# Patient Record
Sex: FEMALE | Race: WHITE | NOT HISPANIC OR LATINO | URBAN - METROPOLITAN AREA
[De-identification: names, ages, dates, MRNs, and addresses within clinical notes are randomized per-mention and may not be internally consistent; named-entity substitution may affect disease eponyms.]

---

## 2019-08-28 ENCOUNTER — INPATIENT (INPATIENT)
Facility: HOSPITAL | Age: 69
LOS: 1 days | Discharge: HOME | DRG: 392 | End: 2019-08-30
Attending: SURGERY | Admitting: SURGERY
Payer: MEDICARE

## 2019-08-28 VITALS
TEMPERATURE: 99 F | SYSTOLIC BLOOD PRESSURE: 105 MMHG | HEART RATE: 112 BPM | HEIGHT: 61 IN | WEIGHT: 121.92 LBS | OXYGEN SATURATION: 98 % | DIASTOLIC BLOOD PRESSURE: 70 MMHG | RESPIRATION RATE: 16 BRPM

## 2019-08-28 DIAGNOSIS — Z98.891 HISTORY OF UTERINE SCAR FROM PREVIOUS SURGERY: Chronic | ICD-10-CM

## 2019-08-28 PROCEDURE — 99285 EMERGENCY DEPT VISIT HI MDM: CPT

## 2019-08-28 PROCEDURE — 74177 CT ABD & PELVIS W/CONTRAST: CPT | Mod: 26

## 2019-08-28 RX ORDER — ENOXAPARIN SODIUM 100 MG/ML
40 INJECTION SUBCUTANEOUS DAILY
Refills: 0 | Status: DISCONTINUED | OUTPATIENT
Start: 2019-08-28 | End: 2019-08-30

## 2019-08-28 RX ORDER — FLUTICASONE PROPIONATE 220 MCG
0 AEROSOL WITH ADAPTER (GRAM) INHALATION
Qty: 0 | Refills: 0 | DISCHARGE

## 2019-08-28 RX ORDER — CEFTRIAXONE 500 MG/1
1000 INJECTION, POWDER, FOR SOLUTION INTRAMUSCULAR; INTRAVENOUS ONCE
Refills: 0 | Status: COMPLETED | OUTPATIENT
Start: 2019-08-28 | End: 2019-08-28

## 2019-08-28 RX ORDER — ASPIRIN/CALCIUM CARB/MAGNESIUM 324 MG
1 TABLET ORAL
Qty: 0 | Refills: 0 | DISCHARGE

## 2019-08-28 RX ORDER — IOHEXOL 300 MG/ML
30 INJECTION, SOLUTION INTRAVENOUS ONCE
Refills: 0 | Status: COMPLETED | OUTPATIENT
Start: 2019-08-28 | End: 2019-08-28

## 2019-08-28 RX ORDER — SODIUM CHLORIDE 9 MG/ML
1000 INJECTION, SOLUTION INTRAVENOUS
Refills: 0 | Status: DISCONTINUED | OUTPATIENT
Start: 2019-08-28 | End: 2019-08-29

## 2019-08-28 RX ORDER — METRONIDAZOLE 500 MG
500 TABLET ORAL ONCE
Refills: 0 | Status: COMPLETED | OUTPATIENT
Start: 2019-08-28 | End: 2019-08-28

## 2019-08-28 RX ORDER — MONTELUKAST 4 MG/1
0 TABLET, CHEWABLE ORAL
Qty: 0 | Refills: 0 | DISCHARGE

## 2019-08-28 RX ORDER — ONDANSETRON 8 MG/1
4 TABLET, FILM COATED ORAL ONCE
Refills: 0 | Status: COMPLETED | OUTPATIENT
Start: 2019-08-28 | End: 2019-08-28

## 2019-08-28 RX ORDER — DIPHENHYDRAMINE HCL 50 MG
25 CAPSULE ORAL ONCE
Refills: 0 | Status: COMPLETED | OUTPATIENT
Start: 2019-08-28 | End: 2019-08-28

## 2019-08-28 RX ORDER — ESCITALOPRAM OXALATE 10 MG/1
0 TABLET, FILM COATED ORAL
Qty: 0 | Refills: 0 | DISCHARGE

## 2019-08-28 RX ORDER — SODIUM CHLORIDE 9 MG/ML
1000 INJECTION INTRAMUSCULAR; INTRAVENOUS; SUBCUTANEOUS ONCE
Refills: 0 | Status: COMPLETED | OUTPATIENT
Start: 2019-08-28 | End: 2019-08-28

## 2019-08-28 RX ORDER — ASPIRIN/CALCIUM CARB/MAGNESIUM 324 MG
81 TABLET ORAL DAILY
Refills: 0 | Status: DISCONTINUED | OUTPATIENT
Start: 2019-08-28 | End: 2019-08-30

## 2019-08-28 RX ORDER — AZELASTINE 137 UG/1
0 SPRAY, METERED NASAL
Qty: 0 | Refills: 0 | DISCHARGE

## 2019-08-28 RX ORDER — ATORVASTATIN CALCIUM 80 MG/1
0 TABLET, FILM COATED ORAL
Qty: 0 | Refills: 0 | DISCHARGE

## 2019-08-28 RX ORDER — ATORVASTATIN CALCIUM 80 MG/1
20 TABLET, FILM COATED ORAL AT BEDTIME
Refills: 0 | Status: DISCONTINUED | OUTPATIENT
Start: 2019-08-28 | End: 2019-08-30

## 2019-08-28 RX ORDER — ESCITALOPRAM OXALATE 10 MG/1
10 TABLET, FILM COATED ORAL DAILY
Refills: 0 | Status: DISCONTINUED | OUTPATIENT
Start: 2019-08-28 | End: 2019-08-30

## 2019-08-28 RX ADMIN — Medication 25 MILLIGRAM(S): at 18:42

## 2019-08-28 RX ADMIN — SODIUM CHLORIDE 1000 MILLILITER(S): 9 INJECTION INTRAMUSCULAR; INTRAVENOUS; SUBCUTANEOUS at 14:11

## 2019-08-28 RX ADMIN — ONDANSETRON 4 MILLIGRAM(S): 8 TABLET, FILM COATED ORAL at 14:11

## 2019-08-28 RX ADMIN — SODIUM CHLORIDE 1000 MILLILITER(S): 9 INJECTION INTRAMUSCULAR; INTRAVENOUS; SUBCUTANEOUS at 18:44

## 2019-08-28 RX ADMIN — SODIUM CHLORIDE 1000 MILLILITER(S): 9 INJECTION INTRAMUSCULAR; INTRAVENOUS; SUBCUTANEOUS at 17:58

## 2019-08-28 RX ADMIN — Medication 100 MILLIGRAM(S): at 18:43

## 2019-08-28 RX ADMIN — CEFTRIAXONE 100 MILLIGRAM(S): 500 INJECTION, POWDER, FOR SOLUTION INTRAMUSCULAR; INTRAVENOUS at 17:58

## 2019-08-28 RX ADMIN — IOHEXOL 30 MILLILITER(S): 300 INJECTION, SOLUTION INTRAVENOUS at 14:11

## 2019-08-28 RX ADMIN — CEFTRIAXONE 1000 MILLIGRAM(S): 500 INJECTION, POWDER, FOR SOLUTION INTRAMUSCULAR; INTRAVENOUS at 18:44

## 2019-08-28 RX ADMIN — IOHEXOL 30 MILLILITER(S): 300 INJECTION, SOLUTION INTRAVENOUS at 22:24

## 2019-08-28 NOTE — ED ADULT TRIAGE NOTE - CHIEF COMPLAINT QUOTE
pt c/o lower abdominal since Sunday. dx with UTI at urgent care yesterday. denies fever/chills, hematuria.

## 2019-08-28 NOTE — ED PROVIDER NOTE - OBJECTIVE STATEMENT
69 female presents to ED with concern for lower abdominal pain over the past 3 days.  She was seen at urgent care yesterday and found to have a urinary tract infection - started on therapy with macrobid which she has taken x 1 day.  She notes pain is not improving, prompting her ED visit today.  She denies associated fever, chills, chest pain, shortness of breath, abdominal pain, nausea, emesis, changes to bowel movements, peripheral edema, rashes, recent travel, sick contacts or any additional acute complaints or concerns at this time.

## 2019-08-28 NOTE — ED ADULT NURSE REASSESSMENT NOTE - NS ED NURSE REASSESS COMMENT FT1
as per surgery md mari pierson and md ramírez, pt is to receive a ct 4 hours after she drinks contrast. pt is aware.

## 2019-08-28 NOTE — H&P ADULT - NSHPLABSRESULTS_GEN_ALL_CORE
Vital Signs Last 24 Hrs  T(C): 36.7 (28 Aug 2019 22:15), Max: 37.3 (28 Aug 2019 13:14)  T(F): 98 (28 Aug 2019 22:15), Max: 99.2 (28 Aug 2019 18:19)  HR: 75 (28 Aug 2019 22:15) (75 - 112)  BP: 133/82 (28 Aug 2019 22:15) (105/70 - 133/82)  BP(mean): --  RR: 16 (28 Aug 2019 22:15) (16 - 18)  SpO2: 95% (28 Aug 2019 22:15) (94% - 98%)      LABS:                        11.4   13.39 )-----------( 320      ( 28 Aug 2019 13:44 )             35.9     08-28    138  |  102  |  17  ----------------------------<  111<H>  3.8   |  23  |  0.88    Ca    9.0      28 Aug 2019 13:44    TPro  7.1  /  Alb  3.7  /  TBili  0.9  /  DBili  <0.2  /  AST  25  /  ALT  18  /  AlkPhos  118  08-28    PT/INR - ( 28 Aug 2019 14:15 )   PT: 12.5 sec;   INR: 1.10          PTT - ( 28 Aug 2019 14:15 )  PTT:27.8 sec    < from: CT Abdomen and Pelvis w/ Oral Cont and w/ IV Cont (08.28.19 @ 16:08) >    FINDINGS:    LOWER CHEST: Basilar atelectasis in the right lower lobe. Multiple 3 to 5   mm groundglass opacities in the visualized bilateral lower lungs and   bronchovascular distribution. Linear atelectases in both lower lobes. No   pleural effusion.    LIVER: Punctate hypodensity in the segment 2 (3:13), likely hepatic cyst.   6 mm focal hypodensity in the segment 8 (3:20), too small to   characterize, and may reflect hepatic hemangioma. The main portal vein is   patent.  GALLBLADDER AND BILIARY DUCTS: Normal caliber.  SPLEEN: Within normal limits.  PANCREAS: Within normal limits.  ADRENALS: Within normal limits.  KIDNEYS/URETERS: Mildly dilated palpable calyceal system bilaterally.   Nonobstructing 4 mm renal stone in the left interpolar kidney. No focal   parenchymal abnormality.    BLADDER: Mild diffuse wall thickening of urinary bladder.  REPRODUCTIVE ORGANS: The uterus and adnexa are unremarkable.    BOWEL: Markedly edematous sigmoid colon demonstrating wall thickening and   mucosal hyperenhancement. Focal area of possible caliber change and   luminal narrowing in the mid sigmoid colon (3:83) is noted. Associated   mild pericolic fluid/fat stranding along the sigmoid colon.   Large hiatal hernia containing proximal half of the stomach. The   dependent transverse colon, with cecum and ileocecal valve identified in   the right upper quadrant. Moderate fecal material within the colon. No   evidence of obstruction.  PERITONEUM/RETROPERITONEUM: Minimal pelvic ascites.  VESSELS:  Atherosclerotic calcification in the abdominal aorta.  LYMPH NODES: Lymph nodes are not enlarged.    BONES AND SOFT TISSUE: Degenerative change in the spine.      IMPRESSION:   1.  Severe diffuse bowel wall thickening in the sigmoid colon, likely   reflecting colitis. Possible are of focal stenosis in the mid sigmoid   coloncausing possible partial obstruction. Moderate fecal impaction in   the colon in the descending colon with mild dilatation. Follow-up with   colonoscopy is recommended once the active inflammation is resolved, to   evaluate for occult malignancy.  2. Mild diffuse thickening of urinary bladder wall with mild fat   stranding, may reflect cystitis.  3.  Large hiatal hernia containing proximal half of the stomach.  4.  4 mm nonobstructing left renal stone.   5.  Multiple 3-5 mm nodular ground glass opacities in both lower lungs,   may be infectious/inflammatory. Follow-up with chest CT at 3 months is   recommended to ensure resolution.           < end of copied text >

## 2019-08-28 NOTE — ED ADULT NURSE NOTE - CHPI ED NUR SYMPTOMS NEG
no blood in stool/no burning urination/no diarrhea/no abdominal distension/no vomiting/no fever/no hematuria/no chills/no dysuria

## 2019-08-28 NOTE — ED ADULT NURSE NOTE - CAS DISCH BELONGINGS RETURNED
jak saini is aware that pts repeat ct is at 230 am. surgery md mari pierson was notified to place the ct order. ct tech was also notified of pts pending ct. jak saini is aware that pts repeat ct is at 230 am. surgery md mari pierson was notified to place the ct order. ct tech was also notified of pts pending ct./Yes

## 2019-08-28 NOTE — ED ADULT NURSE REASSESSMENT NOTE - NS ED NURSE REASSESS COMMENT FT1
Patient a/o X 3, no abdominal pain or nausea complaint, vital signs stable, NSS 1 L bolus completed, NPO observed.  CT scan done.  Results and disposition pending.

## 2019-08-28 NOTE — ED ADULT NURSE REASSESSMENT NOTE - NS ED NURSE REASSESS COMMENT FT1
Patient a/oX 3, anxious, c/o of lower abdominal pain since Sunday, w/ nausea,  no vomitting, no diarrhea/dysuria, states had previously urinary urgency and frequency, resolved after Dx w/ UTI and taking Bactrim PO. Vital signs stable.  Left AC PIV #20 in place, all labs sent, no complications.  NSS 1 L bolus ongoing, Zofran 4mg IVP administered,  Oral contrast ongoing, tolerating well.  CT scan pending.

## 2019-08-28 NOTE — H&P ADULT - ASSESSMENT
69 F PMH urinary retention unknown origin(self straight cath 6-7xdaily), TIA 2 yrs ago on Aspirin, PSH , presenting with 3 days of LLQ pain CT with evidence of sigmoid colitis and possible stricture of descending colon.    Admit to General Surgery, Dr. Vanegas, [facility]  NPO/IVF  Repeat CT Abd/pelvis w/po and IV contrast for further evaluation of partial LBO  GI consult for sigmoid colitis-Dr. Ortega  AM labs  Encourage OOB/Ambulation  Lovenox/SCDs for DVT prophylaxis  Discussed w/chief resident and Dr. Dawn 69 F PMH urinary retention unknown origin(self straight cath 6-7xdaily), TIA 2 yrs ago on Aspirin, PSH , presenting with 3 days of LLQ pain CT with evidence of sigmoid colitis and possible stricture of descending colon.    Admit to General Surgery, Dr. Vanegas, [facility]  NPO/IVF  Repeat CT Abd/pelvis w/po and IV contrast for further evaluation of partial LBO  Macrobid for UTI  GI consult for sigmoid colitis-Dr. Ortega  AM labs  Encourage OOB/Ambulation  Lovenox/SCDs for DVT prophylaxis  Discussed w/chief resident and Dr. Dawn

## 2019-08-28 NOTE — ED ADULT NURSE NOTE - OBJECTIVE STATEMENT
Patient c/o of abdominal pain w/ nausea, no vomitting, states Dx w/ UTI at  last Sunday, taking BActrim PO, still w/ abdominal pain.

## 2019-08-28 NOTE — H&P ADULT - NSHPPHYSICALEXAM_GEN_ALL_CORE
General: NAD, resting comfortably  Neuro: AAOX3, EOMI, PERRLA, no scleral icterus  Pulm: CTAB, Nonlabored breathing  CV: S1/S2 normal, no murmurs  Abdomen: soft, nondistended, moderate tenderness in LLQ, no rebound, no guarding  Extremities: WWP, No LE edema b/l

## 2019-08-28 NOTE — H&P ADULT - HISTORY OF PRESENT ILLNESS
69 F PMH TIA 2yrs ago, urinary retention unknown origin(self straight cath 6-7xdaily), TIA 2 yrs ago on Aspirin, PSH , presenting with 3 days of LLQ pain associated with one episode of nausea and spit up, Pt states sx started  at rest and worsened to moderated achey pain, pt went to urgent care and found to have UTI, placed on Macrobid. Today pt states pain continued and she presented to ED for further management. Denies N/vomiting, last episode of nausea and spit up on , denies F/chills, denies chest pain/dypnea. Passing flatus, reports constipation this past week but had one well formed bowel movement yesterday and another well formed normal BM this am. Denies ever having EGD, last colonoscopy 7-8 yrs ago and normal per pt. Denies personal or family hx of IBD, denies family hx of cancer. 69 F PMH urinary retention unknown origin(self straight cath 6-7xdaily), TIA 2 yrs ago on Aspirin, PSH , presenting with 3 days of LLQ pain associated with one episode of nausea and spit up, Pt states sx started  at rest and worsened to moderated achey pain, pt went to urgent care and found to have UTI, placed on Macrobid. Today pt states pain continued and she presented to ED for further management. Denies N/vomiting, last episode of nausea and spit up on , denies F/chills, denies chest pain/dypnea. Passing flatus, reports constipation this past week but had one well formed bowel movement yesterday and another well formed normal BM this am. Denies ever having EGD, last colonoscopy 7-8 yrs ago and normal per pt. Denies personal or family hx of IBD, denies family hx of cancer.

## 2019-08-28 NOTE — ED PROVIDER NOTE - CLINICAL SUMMARY MEDICAL DECISION MAKING FREE TEXT BOX
Patient in ED w concern for lower abdominal discomfort.  Non toxic in appearance, + TTP throughout entire abdomen, L>R.  Labs and CT abd/pel reviewed.  Slightly elevated WBC ct noted.  CT concerning for colitis with possible obstruction and general surgery team is consulted and in ED to evaluate patient.  IV abx ordered and patient is given additional IVF bolus.  Gen sx plan for admission at this time.  Gen sx has discussed w patient as she is upset for her wait in disposition.  Patient is ultimately agreeable to plan for admission.  Will admit at this time.

## 2019-08-29 DIAGNOSIS — R93.3 ABNORMAL FINDINGS ON DIAGNOSTIC IMAGING OF OTHER PARTS OF DIGESTIVE TRACT: ICD-10-CM

## 2019-08-29 LAB
ANION GAP SERPL CALC-SCNC: 10 MMOL/L — SIGNIFICANT CHANGE UP (ref 5–17)
BUN SERPL-MCNC: 11 MG/DL — SIGNIFICANT CHANGE UP (ref 7–23)
CALCIUM SERPL-MCNC: 8.2 MG/DL — LOW (ref 8.4–10.5)
CHLORIDE SERPL-SCNC: 109 MMOL/L — HIGH (ref 96–108)
CO2 SERPL-SCNC: 22 MMOL/L — SIGNIFICANT CHANGE UP (ref 22–31)
CREAT SERPL-MCNC: 0.82 MG/DL — SIGNIFICANT CHANGE UP (ref 0.5–1.3)
GLUCOSE SERPL-MCNC: 93 MG/DL — SIGNIFICANT CHANGE UP (ref 70–99)
HCT VFR BLD CALC: 36.7 % — SIGNIFICANT CHANGE UP (ref 34.5–45)
HCV AB S/CO SERPL IA: 0.09 S/CO — SIGNIFICANT CHANGE UP
HCV AB SERPL-IMP: SIGNIFICANT CHANGE UP
HGB BLD-MCNC: 11 G/DL — LOW (ref 11.5–15.5)
MAGNESIUM SERPL-MCNC: 2 MG/DL — SIGNIFICANT CHANGE UP (ref 1.6–2.6)
MCHC RBC-ENTMCNC: 28.2 PG — SIGNIFICANT CHANGE UP (ref 27–34)
MCHC RBC-ENTMCNC: 30 GM/DL — LOW (ref 32–36)
MCV RBC AUTO: 94.1 FL — SIGNIFICANT CHANGE UP (ref 80–100)
NRBC # BLD: 0 /100 WBCS — SIGNIFICANT CHANGE UP (ref 0–0)
PHOSPHATE SERPL-MCNC: 2.2 MG/DL — LOW (ref 2.5–4.5)
PLATELET # BLD AUTO: 291 K/UL — SIGNIFICANT CHANGE UP (ref 150–400)
POTASSIUM SERPL-MCNC: 3.5 MMOL/L — SIGNIFICANT CHANGE UP (ref 3.5–5.3)
POTASSIUM SERPL-SCNC: 3.5 MMOL/L — SIGNIFICANT CHANGE UP (ref 3.5–5.3)
RBC # BLD: 3.9 M/UL — SIGNIFICANT CHANGE UP (ref 3.8–5.2)
RBC # FLD: 14.3 % — SIGNIFICANT CHANGE UP (ref 10.3–14.5)
SODIUM SERPL-SCNC: 141 MMOL/L — SIGNIFICANT CHANGE UP (ref 135–145)
WBC # BLD: 8.62 K/UL — SIGNIFICANT CHANGE UP (ref 3.8–10.5)
WBC # FLD AUTO: 8.62 K/UL — SIGNIFICANT CHANGE UP (ref 3.8–10.5)

## 2019-08-29 PROCEDURE — 74177 CT ABD & PELVIS W/CONTRAST: CPT | Mod: 26

## 2019-08-29 RX ORDER — NITROFURANTOIN MACROCRYSTAL 50 MG
100 CAPSULE ORAL
Refills: 0 | Status: DISCONTINUED | OUTPATIENT
Start: 2019-08-29 | End: 2019-08-29

## 2019-08-29 RX ORDER — SENNA PLUS 8.6 MG/1
2 TABLET ORAL AT BEDTIME
Refills: 0 | Status: DISCONTINUED | OUTPATIENT
Start: 2019-08-29 | End: 2019-08-30

## 2019-08-29 RX ORDER — ONDANSETRON 8 MG/1
4 TABLET, FILM COATED ORAL EVERY 4 HOURS
Refills: 0 | Status: DISCONTINUED | OUTPATIENT
Start: 2019-08-29 | End: 2019-08-30

## 2019-08-29 RX ORDER — DEXTROSE MONOHYDRATE, SODIUM CHLORIDE, AND POTASSIUM CHLORIDE 50; .745; 4.5 G/1000ML; G/1000ML; G/1000ML
1000 INJECTION, SOLUTION INTRAVENOUS
Refills: 0 | Status: DISCONTINUED | OUTPATIENT
Start: 2019-08-29 | End: 2019-08-30

## 2019-08-29 RX ORDER — MULTIVIT WITH MIN/MFOLATE/K2 340-15/3 G
1 POWDER (GRAM) ORAL ONCE
Refills: 0 | Status: COMPLETED | OUTPATIENT
Start: 2019-08-29 | End: 2019-08-29

## 2019-08-29 RX ORDER — ONDANSETRON 8 MG/1
4 TABLET, FILM COATED ORAL ONCE
Refills: 0 | Status: COMPLETED | OUTPATIENT
Start: 2019-08-29 | End: 2019-08-29

## 2019-08-29 RX ORDER — POLYETHYLENE GLYCOL 3350 17 G/17G
17 POWDER, FOR SOLUTION ORAL
Refills: 0 | Status: DISCONTINUED | OUTPATIENT
Start: 2019-08-29 | End: 2019-08-30

## 2019-08-29 RX ORDER — SOD SULF/SODIUM/NAHCO3/KCL/PEG
4000 SOLUTION, RECONSTITUTED, ORAL ORAL ONCE
Refills: 0 | Status: DISCONTINUED | OUTPATIENT
Start: 2019-08-29 | End: 2019-08-30

## 2019-08-29 RX ORDER — POTASSIUM CHLORIDE 20 MEQ
10 PACKET (EA) ORAL
Refills: 0 | Status: COMPLETED | OUTPATIENT
Start: 2019-08-29 | End: 2019-08-29

## 2019-08-29 RX ORDER — NITROFURANTOIN MACROCRYSTAL 50 MG
100 CAPSULE ORAL
Refills: 0 | Status: DISCONTINUED | OUTPATIENT
Start: 2019-08-29 | End: 2019-08-30

## 2019-08-29 RX ORDER — POTASSIUM PHOSPHATE, MONOBASIC POTASSIUM PHOSPHATE, DIBASIC 236; 224 MG/ML; MG/ML
15 INJECTION, SOLUTION INTRAVENOUS ONCE
Refills: 0 | Status: COMPLETED | OUTPATIENT
Start: 2019-08-29 | End: 2019-08-29

## 2019-08-29 RX ORDER — DOCUSATE SODIUM 100 MG
100 CAPSULE ORAL THREE TIMES A DAY
Refills: 0 | Status: DISCONTINUED | OUTPATIENT
Start: 2019-08-29 | End: 2019-08-30

## 2019-08-29 RX ORDER — PANTOPRAZOLE SODIUM 20 MG/1
40 TABLET, DELAYED RELEASE ORAL
Refills: 0 | Status: DISCONTINUED | OUTPATIENT
Start: 2019-08-29 | End: 2019-08-30

## 2019-08-29 RX ORDER — SOD SULF/SODIUM/NAHCO3/KCL/PEG
4000 SOLUTION, RECONSTITUTED, ORAL ORAL ONCE
Refills: 0 | Status: DISCONTINUED | OUTPATIENT
Start: 2019-08-29 | End: 2019-08-29

## 2019-08-29 RX ADMIN — ONDANSETRON 4 MILLIGRAM(S): 8 TABLET, FILM COATED ORAL at 20:56

## 2019-08-29 RX ADMIN — SODIUM CHLORIDE 90 MILLILITER(S): 9 INJECTION, SOLUTION INTRAVENOUS at 03:15

## 2019-08-29 RX ADMIN — Medication 100 MILLIGRAM(S): at 21:22

## 2019-08-29 RX ADMIN — POLYETHYLENE GLYCOL 3350 17 GRAM(S): 17 POWDER, FOR SOLUTION ORAL at 17:55

## 2019-08-29 RX ADMIN — SENNA PLUS 2 TABLET(S): 8.6 TABLET ORAL at 21:22

## 2019-08-29 RX ADMIN — Medication 100 MILLIGRAM(S): at 17:55

## 2019-08-29 RX ADMIN — Medication 100 MILLIGRAM(S): at 15:50

## 2019-08-29 RX ADMIN — ESCITALOPRAM OXALATE 10 MILLIGRAM(S): 10 TABLET, FILM COATED ORAL at 12:20

## 2019-08-29 RX ADMIN — Medication 100 MILLIGRAM(S): at 07:20

## 2019-08-29 RX ADMIN — Medication 100 MILLIEQUIVALENT(S): at 08:23

## 2019-08-29 RX ADMIN — Medication 1 BOTTLE: at 17:55

## 2019-08-29 RX ADMIN — Medication 81 MILLIGRAM(S): at 12:20

## 2019-08-29 RX ADMIN — ONDANSETRON 4 MILLIGRAM(S): 8 TABLET, FILM COATED ORAL at 17:55

## 2019-08-29 RX ADMIN — ATORVASTATIN CALCIUM 20 MILLIGRAM(S): 80 TABLET, FILM COATED ORAL at 21:22

## 2019-08-29 RX ADMIN — DEXTROSE MONOHYDRATE, SODIUM CHLORIDE, AND POTASSIUM CHLORIDE 150 MILLILITER(S): 50; .745; 4.5 INJECTION, SOLUTION INTRAVENOUS at 18:03

## 2019-08-29 RX ADMIN — POTASSIUM PHOSPHATE, MONOBASIC POTASSIUM PHOSPHATE, DIBASIC 63.75 MILLIMOLE(S): 236; 224 INJECTION, SOLUTION INTRAVENOUS at 18:44

## 2019-08-29 RX ADMIN — Medication 100 MILLIEQUIVALENT(S): at 12:19

## 2019-08-29 RX ADMIN — Medication 100 MILLIEQUIVALENT(S): at 15:49

## 2019-08-29 RX ADMIN — ENOXAPARIN SODIUM 40 MILLIGRAM(S): 100 INJECTION SUBCUTANEOUS at 07:20

## 2019-08-29 NOTE — PROGRESS NOTE ADULT - SUBJECTIVE AND OBJECTIVE BOX
SUBJECTIVE: No  bowel movement yet, feeling ok/unchanged. Patient seen and examined bedside by chief resident.    aspirin  chewable 81 milliGRAM(s) Oral daily  enoxaparin Injectable 40 milliGRAM(s) SubCutaneous daily  nitrofurantoin monohydrate/macrocrystals (MACROBID) 100 milliGRAM(s) Oral two times a day with meals    MEDICATIONS  (PRN):      I&O's Detail      Vital Signs Last 24 Hrs  T(C): 36.9 (29 Aug 2019 07:18), Max: 37.3 (28 Aug 2019 13:14)  T(F): 98.5 (29 Aug 2019 07:18), Max: 99.2 (28 Aug 2019 18:19)  HR: 64 (29 Aug 2019 07:18) (64 - 112)  BP: 118/70 (29 Aug 2019 07:18) (101/64 - 133/82)  BP(mean): --  RR: 17 (29 Aug 2019 07:18) (16 - 18)  SpO2: 96% (29 Aug 2019 07:18) (94% - 98%)    General: NAD, resting comfortably in bed  C/V: NSR  Pulm: Nonlabored breathing, no respiratory distress  Abd: softly distended, nontender      LABS:                        11.0   8.62  )-----------( 291      ( 29 Aug 2019 05:45 )             36.7     08-    141  |  109<H>  |  11  ----------------------------<  93  3.5   |  22  |  0.82    Ca    8.2<L>      29 Aug 2019 05:45  Phos  2.2     -  Mg     2.0         TPro  7.1  /  Alb  3.7  /  TBili  0.9  /  DBili  <0.2  /  AST  25  /  ALT  18  /  AlkPhos  118  08-28    PT/INR - ( 28 Aug 2019 14:15 )   PT: 12.5 sec;   INR: 1.10          PTT - ( 28 Aug 2019 14:15 )  PTT:27.8 sec  Urinalysis Basic - ( 28 Aug 2019 14:02 )    Color: Yellow / Appearance: Clear / S.020 / pH: x  Gluc: x / Ketone: 15 mg/dL  / Bili: Small / Urobili: 0.2 E.U./dL   Blood: x / Protein: 30 mg/dL / Nitrite: NEGATIVE   Leuk Esterase: Trace / RBC: < 5 /HPF / WBC > 10 /HPF   Sq Epi: x / Non Sq Epi: 0-5 /HPF / Bacteria: Present /HPF        RADIOLOGY & ADDITIONAL STUDIES:  CT Abdomen and Pelvis w/ Oral Cont and w/ IV Cont:   ******PRELIMINARY REPORT******    ******PRELIMINARY REPORT******            EXAM:  CT ABDOMEN AND PELVIS OC IC                          PROCEDURE DATE:  2019    ******PRELIMINARY REPORT******    ******PRELIMINARY REPORT******              INTERPRETATION:  CT of the ABDOMEN and PELVIS with intravenous contrast   dated 2019 2:39 AM    INDICATION: Sigmoid colitis and also bowel obstruction.    TECHNIQUE: CT of the abdomen and pelvis was performed using oral contrast   and 100 cc of intravenous Optiray 350. Axial, sagittal and coronal images   were created and reviewed.    COMPARISON: None.    FINDINGS:     Images of the lower chest demonstrate right basilar atelectasis. Large   hiatal hernia.    No change in a few nonspecific subcentimeter hepatic hypodensities. The   hepatic and portal veins are patent. No radiopaque stones are seen within   the gallbladder. The pancreas is normal in appearance. No splenic   abnormalities are seen.    The adrenal glands are unremarkable. The kidneys are normal in   appearance. No change in 4 mm nonobstructing left renal calculus. No   hydronephrosis or perinephric stranding.    No abdominal aortic aneurysm is seen. No lymphadenopathy is seen.    Evaluation of the bowel again demonstrates mural thickening and   perienteric stranding extending from the splenic flexure through the   sigmoid colon. Enteric contrast has not yet reached these areas of bowel.   Proximal large bowel remains mildly distended with air-fluid levels.   There has been interval transit of stool past the point of previously   suspected sigmoid stricture. There is no evidence of pneumatosis. The   descending colon remains distended with stool.    Images of the pelvis demonstrate the uterus and adnexae to be normal in   appearance. No filling defects are seen within the urinary bladder.    Evaluation of the osseous structures demonstrates degenerative changes of   the spine.      IMPRESSION:  Compared to prior exam, there has been interval passage of stool past the   previously questioned sigmoid stenosis, making stricture unlikely. The   descending colon remains distended with stool. Enteric contrast is   retained within the transverse colon, which is mildly distended.                Thank you for the opportunity to participate in the care of this patient.  ******PRELIMINARY REPORT******    ******PRELIMINARY REPORT******          SHWETA GONCALVES M.D., RADIOLOGY RESIDENT                     (19 @ 02:39)  CT Abdomen and Pelvis w/ Oral Cont and w/ IV Cont:   EXAM:  CT ABDOMEN AND PELVIS OC IC                          PROCEDURE DATE:  2019          INTERPRETATION:  CLINICAL INFORMATION: Abdominal pain    COMPARISON: None.    PROCEDURE:   CT of the Abdomen and Pelvis was performed with intravenous contrast.   Intravenous contrast: 90 ml Omnipaque 350. 10 ml discarded.  Oral contrast: positive contrast was administered.  Sagittal and coronal reformats were performed.    FINDINGS:    LOWER CHEST: Basilar atelectasis in the right lower lobe. Multiple 3 to 5   mm groundglass opacities in the visualized bilateral lower lungs and   bronchovascular distribution. Linear atelectases in both lower lobes. No   pleural effusion.    LIVER: Punctate hypodensity in the segment 2 (3:13), likely hepatic cyst.   6 mm focal hypodensity in the segment 8 (3:20), too small to   characterize, and may reflect hepatic hemangioma. The main portal vein is   patent.  GALLBLADDER AND BILIARY DUCTS: Normal caliber.  SPLEEN: Within normal limits.  PANCREAS: Within normal limits.  ADRENALS: Within normal limits.  KIDNEYS/URETERS: Mildly dilated palpable calyceal system bilaterally.   Nonobstructing 4 mm renal stone in the left interpolar kidney. No focal   parenchymal abnormality.    BLADDER: Mild diffuse wall thickening of urinary bladder.  REPRODUCTIVE ORGANS: The uterus and adnexa are unremarkable.    BOWEL: Markedly edematous sigmoid colon demonstrating wall thickening and   mucosal hyperenhancement. Focal area of possible caliber change and   luminal narrowing in the mid sigmoid colon (3:83) is noted. Associated   mild pericolic fluid/fat stranding along the sigmoid colon.   Large hiatal hernia containing proximal half of the stomach. The   dependent transverse colon, with cecum and ileocecal valve identified in   the right upper quadrant. Moderate fecal material within the colon. No   evidence of obstruction.  PERITONEUM/RETROPERITONEUM: Minimal pelvic ascites.  VESSELS:  Atherosclerotic calcification in the abdominal aorta.  LYMPH NODES: Lymph nodes are not enlarged.    BONES AND SOFT TISSUE: Degenerative change in the spine.      IMPRESSION:   1.  Severe diffuse bowel wall thickening in the sigmoid colon, likely   reflecting colitis. Possible are of focal stenosis in the mid sigmoid   coloncausing possible partial obstruction. Moderate fecal impaction in   the colon in the descending colon with mild dilatation. Follow-up with   colonoscopy is recommended once the active inflammation is resolved, to   evaluate for occult malignancy.  2. Mild diffuse thickening of urinary bladder wall with mild fat   stranding, may reflect cystitis.  3.  Large hiatal hernia containing proximal half of the stomach.  4.  4 mm nonobstructing left renal stone.   5.  Multiple 3-5 mm nodular ground glass opacities in both lower lungs,   may be infectious/inflammatory. Follow-up with chest CT at 3 months is   recommended to ensure resolution.                 Thank you for the opportunity to participate in the care of this patient.        JAVAN NORRIS M.D., ATTENDING RADIOLOGIST  This document has been electronically signed. Aug 28 2019  4:50PM               (19 @ 16:08)

## 2019-08-29 NOTE — PATIENT PROFILE ADULT - MONEY FOR FOOD
[facility]  Family Medicine  [facility]  [place], [place] [facility]  Phone: [phone]  Fax:   Follow Up Time:
no

## 2019-08-29 NOTE — PROGRESS NOTE ADULT - SUBJECTIVE AND OBJECTIVE BOX
INTERVAL HPI/OVERNIGHT EVENTS:   SURGERY ATTENDING      SUBJECTIVE:  Flatus: [x ] YES [ ] NO             Bowel Movement: [ x] YES [ ] NO  Pain (0-10):    0        Pain Control Adequate: [x ] YES [ ] NO  Nausea: [ ] YES [x ] NO            Vomiting: [ ] YES [x ] NO  Diarrhea: [ ] YES [x ] NO         Constipation: [x ] YES [ ] NO     Chest Pain: [ ] YES [x ] NO    SOB:  [ ] YES [x ] NO    MEDICATIONS  (STANDING):  aspirin  chewable 81 milliGRAM(s) Oral daily  atorvastatin 20 milliGRAM(s) Oral at bedtime  dextrose 5% + lactated ringers with potassium chloride 20 mEq/L 1000 milliLiter(s) (150 mL/Hr) IV Continuous <Continuous>  docusate sodium 100 milliGRAM(s) Oral three times a day  enoxaparin Injectable 40 milliGRAM(s) SubCutaneous daily  escitalopram 10 milliGRAM(s) Oral daily  magnesium citrate Oral Solution 1 Bottle Oral once  nitrofurantoin monohydrate/macrocrystals (MACROBID) 100 milliGRAM(s) Oral two times a day with meals  ondansetron Injectable 4 milliGRAM(s) IV Push once  pantoprazole    Tablet 40 milliGRAM(s) Oral before breakfast  polyethylene glycol 3350 17 Gram(s) Oral two times a day  polyethylene glycol/electrolyte Solution. 4000 milliLiter(s) Oral once  potassium phosphate IVPB 15 milliMole(s) IV Intermittent once  senna 2 Tablet(s) Oral at bedtime    MEDICATIONS  (PRN):  ondansetron Injectable 4 milliGRAM(s) IV Push every 4 hours PRN Nausea and/or Vomiting      Vital Signs Last 24 Hrs  T(C): 36.7 (29 Aug 2019 12:50), Max: 37.3 (28 Aug 2019 18:19)  T(F): 98 (29 Aug 2019 12:50), Max: 99.2 (28 Aug 2019 18:19)  HR: 78 (29 Aug 2019 12:50) (64 - 88)  BP: 118/75 (29 Aug 2019 12:50) (101/64 - 133/82)  BP(mean): --  RR: 17 (29 Aug 2019 12:50) (16 - 17)  SpO2: 97% (29 Aug 2019 12:50) (94% - 97%)    PHYSICAL EXAM:      Constitutional:    Eyes:    ENMT:    Neck:    Breasts:    Back:    Respiratory:    Cardiovascular:    Gastrointestinal:    Genitourinary:    Rectal:    Extremities:    Vascular:    Neurological:    Skin:    Lymph Nodes:    Musculoskeletal:    Psychiatric:        I&O's Detail    29 Aug 2019 07:01  -  29 Aug 2019 17:54  --------------------------------------------------------  IN:  Total IN: 0 mL    OUT:    Voided: 300 mL  Total OUT: 300 mL    Total NET: -300 mL          LABS:                        11.0   8.62  )-----------( 291      ( 29 Aug 2019 05:45 )             36.7         141  |  109<H>  |  11  ----------------------------<  93  3.5   |  22  |  0.82    Ca    8.2<L>      29 Aug 2019 05:45  Phos  2.2       Mg     2.0         TPro  7.1  /  Alb  3.7  /  TBili  0.9  /  DBili  <0.2  /  AST  25  /  ALT  18  /  AlkPhos  118      PT/INR - ( 28 Aug 2019 14:15 )   PT: 12.5 sec;   INR: 1.10          PTT - ( 28 Aug 2019 14:15 )  PTT:27.8 sec  Urinalysis Basic - ( 28 Aug 2019 14:02 )    Color: Yellow / Appearance: Clear / S.020 / pH: x  Gluc: x / Ketone: 15 mg/dL  / Bili: Small / Urobili: 0.2 E.U./dL   Blood: x / Protein: 30 mg/dL / Nitrite: NEGATIVE   Leuk Esterase: Trace / RBC: < 5 /HPF / WBC > 10 /HPF   Sq Epi: x / Non Sq Epi: 0-5 /HPF / Bacteria: Present /HPF        RADIOLOGY & ADDITIONAL STUDIES:

## 2019-08-29 NOTE — DIETITIAN INITIAL EVALUATION ADULT. - OTHER INFO
69F PMHx urinary retention unknown origin (self straight cath 6-7xdaily), TIA 2 yrs ago on Aspirin, PSH , presenting with 3 days of LLQ pain associated with one episode of nausea and spit up, Pt states sx started  at rest and worsened to moderated achey pain, pt went to urgent care and found to have UTI. Additionally c/o constipation, x1 formed BM on admission. CT with evidence of sigmoid colitis and possible stricture of descending colon. Admitted for GI w/u and UTI management. NPO for colonoscopy, difficulties noted tolerating prep, causing weakness/ pre-syncopal episode. Denies n/v, chewing/ swallowing issues impacting intake, skin intact. NKFA or changes in wt PTA, follows regular diet. Will follow for ed upon diet advancement/ post c-scope.

## 2019-08-29 NOTE — PROGRESS NOTE ADULT - ATTENDING COMMENTS
68 yo F with colitis and possible sigmoid colon stricture, hiatal hernia.  GI to scope tomorrow, bowel prep.

## 2019-08-29 NOTE — CONSULT NOTE ADULT - ASSESSMENT
as above abnormal radiological imaging  if patient able to prep prep for colonoscopy: clear liquid diet and one 10 oz sized magnesium citrate tonight

## 2019-08-29 NOTE — DIETITIAN INITIAL EVALUATION ADULT. - ADD RECOMMEND
1. Advance diet as tolerated 2. Manage pain prn 3. Monitor and replete lytes prn 4. Encourage intake through day

## 2019-08-29 NOTE — DIETITIAN INITIAL EVALUATION ADULT. - ENERGY NEEDS
ABW used for calculations as pt between % of IBW.   ABW 55.3kg, IBW 47kg, 117% IBW, ht 61", BMI 23   Nutrient needs based on [facility] standards of care for maintenance in adults, fluid per team

## 2019-08-29 NOTE — CONSULT NOTE ADULT - SUBJECTIVE AND OBJECTIVE BOX
69 F PMH urinary retention unknown origin(self straight cath 6-7xdaily), TIA 2 yrs ago on Aspirin, PSH , presenting with 3 days of LLQ pain associated with one episode of nausea and spit up, Pt states sx started  at rest and worsened to moderated achey pain, pt went to urgent care and found to have UTI, placed on Macrobid. Pt states pain continued and she presented to ED for further management.  CT = sigmoid colitis vs stricture.  Last colonoscopy 7-8 yrs ago and normal per pt. Denies personal or family hx of IBD, denies family hx of cancer. (28 Aug 2019 23:26)  Had issues with taking prep for colonoscopy almost fainted and was vomiting    REVIEW OF SYSTEMS:  Constitutional: No fever, weight loss or fatigue  ENMT:  No difficulty hearing, tinnitus, vertigo; No sinus or throat pain  Respiratory: No cough, wheezing, chills or hemoptysis  Cardiovascular: No chest pain, palpitations, dizziness or leg swelling  Gastrointestinal: less abdominal  pain. No nausea, vomiting or hematemesis; No diarrhea or constipation. No melena or hematochezia.  Skin: No itching, burning, rashes or lesions   Musculoskeletal: No joint pain or swelling; No muscle, back or extremity pain    PAST MEDICAL & SURGICAL HISTORY:  Brain TIA: 2017  Urinary retention  S/P : 34 yrs ago      FAMILY HISTORY:      SOCIAL HISTORY:  Smoking Status: [ ] Current, [ ] Former, [ ] Never  Pack Years:    MEDICATIONS:  MEDICATIONS  (STANDING):  aspirin  chewable 81 milliGRAM(s) Oral daily  atorvastatin 20 milliGRAM(s) Oral at bedtime  docusate sodium 100 milliGRAM(s) Oral three times a day  enoxaparin Injectable 40 milliGRAM(s) SubCutaneous daily  escitalopram 10 milliGRAM(s) Oral daily  lactated ringers. 1000 milliLiter(s) (100 mL/Hr) IV Continuous <Continuous>  magnesium citrate Oral Solution 1 Bottle Oral once  nitrofurantoin monohydrate/macrocrystals (MACROBID) 100 milliGRAM(s) Oral two times a day with meals  ondansetron Injectable 4 milliGRAM(s) IV Push once  polyethylene glycol 3350 17 Gram(s) Oral two times a day  potassium chloride  10 mEq/100 mL IVPB 10 milliEquivalent(s) IV Intermittent every 1 hour  potassium phosphate IVPB 15 milliMole(s) IV Intermittent once  senna 2 Tablet(s) Oral at bedtime    MEDICATIONS  (PRN):  ondansetron Injectable 4 milliGRAM(s) IV Push every 4 hours PRN Nausea and/or Vomiting      Allergies    sulfa drugs (Unknown)    Intolerances        Vital Signs Last 24 Hrs  T(C): 36.7 (29 Aug 2019 12:50), Max: 37.3 (28 Aug 2019 18:19)  T(F): 98 (29 Aug 2019 12:50), Max: 99.2 (28 Aug 2019 18:19)  HR: 78 (29 Aug 2019 12:50) (64 - 88)  BP: 118/75 (29 Aug 2019 12:50) (101/64 - 133/82)  BP(mean): --  RR: 17 (29 Aug 2019 12:50) (16 - 17)  SpO2: 97% (29 Aug 2019 12:50) (94% - 97%)     @ 07:01  -   @ 14:50  --------------------------------------------------------  IN: 0 mL / OUT: 300 mL / NET: -300 mL          PHYSICAL EXAM:    General: Well developed; well nourished; in no acute distress  HEENT: MMM, conjunctiva and sclera clear  Gastrointestinal: Soft, non-tender non-distended; Normal bowel sounds; No rebound or guarding, sl tenderness LLQ  Extremities: Normal range of motion, No clubbing, cyanosis or edema  Neurological: Alert and oriented x3  Skin: Warm and dry. No obvious rash      LABS:                        11.0   8.62  )-----------( 291      ( 29 Aug 2019 05:45 )             36.7         141  |  109<H>  |  11  ----------------------------<  93  3.5   |  22  |  0.82    Ca    8.2<L>      29 Aug 2019 05:45  Phos  2.2       Mg     2.0         TPro  7.1  /  Alb  3.7  /  TBili  0.9  /  DBili  <0.2  /  AST  25  /  ALT  18  /  AlkPhos  118  08          RADIOLOGY & ADDITIONAL STUDIES:   < from: CT Abdomen and Pelvis w/ Oral Cont and w/ IV Cont (19 @ 02:39) >   CT ABDOMEN AND PELVIS OC IC    < end of copied text >  < from: CT Abdomen and Pelvis w/ Oral Cont and w/ IV Cont (19 @ 02:39) >  1.  No significant interval change in extensive sigmoid colitis. No   significant interval change in the borderline distended descending colon   with moderate amount of stool. Findings may reflect partial obstruction.   2.  Stable appearance of collapsed segment in the mid sigmoid loop in the   right pelvic cavity, which may reflect focal stricture versus collapsed   segment with severe edema. Attention on follow-up is recommended, once   the inflammation subsides.  3.  Stable additional findings.    < end of copied text >

## 2019-08-30 VITALS
HEART RATE: 73 BPM | SYSTOLIC BLOOD PRESSURE: 98 MMHG | TEMPERATURE: 98 F | RESPIRATION RATE: 16 BRPM | OXYGEN SATURATION: 93 % | DIASTOLIC BLOOD PRESSURE: 61 MMHG

## 2019-08-30 LAB
ANION GAP SERPL CALC-SCNC: 9 MMOL/L — SIGNIFICANT CHANGE UP (ref 5–17)
BUN SERPL-MCNC: 8 MG/DL — SIGNIFICANT CHANGE UP (ref 7–23)
CALCIUM SERPL-MCNC: 8.6 MG/DL — SIGNIFICANT CHANGE UP (ref 8.4–10.5)
CHLORIDE SERPL-SCNC: 112 MMOL/L — HIGH (ref 96–108)
CO2 SERPL-SCNC: 25 MMOL/L — SIGNIFICANT CHANGE UP (ref 22–31)
CREAT SERPL-MCNC: 0.72 MG/DL — SIGNIFICANT CHANGE UP (ref 0.5–1.3)
GLUCOSE SERPL-MCNC: 88 MG/DL — SIGNIFICANT CHANGE UP (ref 70–99)
HCT VFR BLD CALC: 31.2 % — LOW (ref 34.5–45)
HGB BLD-MCNC: 9.8 G/DL — LOW (ref 11.5–15.5)
MAGNESIUM SERPL-MCNC: 2 MG/DL — SIGNIFICANT CHANGE UP (ref 1.6–2.6)
MCHC RBC-ENTMCNC: 28.5 PG — SIGNIFICANT CHANGE UP (ref 27–34)
MCHC RBC-ENTMCNC: 31.4 GM/DL — LOW (ref 32–36)
MCV RBC AUTO: 90.7 FL — SIGNIFICANT CHANGE UP (ref 80–100)
NRBC # BLD: 0 /100 WBCS — SIGNIFICANT CHANGE UP (ref 0–0)
PHOSPHATE SERPL-MCNC: 2.1 MG/DL — LOW (ref 2.5–4.5)
PLATELET # BLD AUTO: 259 K/UL — SIGNIFICANT CHANGE UP (ref 150–400)
POTASSIUM SERPL-MCNC: 4.2 MMOL/L — SIGNIFICANT CHANGE UP (ref 3.5–5.3)
POTASSIUM SERPL-SCNC: 4.2 MMOL/L — SIGNIFICANT CHANGE UP (ref 3.5–5.3)
RBC # BLD: 3.44 M/UL — LOW (ref 3.8–5.2)
RBC # FLD: 14 % — SIGNIFICANT CHANGE UP (ref 10.3–14.5)
SODIUM SERPL-SCNC: 146 MMOL/L — HIGH (ref 135–145)
WBC # BLD: 7.86 K/UL — SIGNIFICANT CHANGE UP (ref 3.8–10.5)
WBC # FLD AUTO: 7.86 K/UL — SIGNIFICANT CHANGE UP (ref 3.8–10.5)

## 2019-08-30 RX ORDER — SODIUM CHLORIDE 9 MG/ML
1000 INJECTION, SOLUTION INTRAVENOUS
Refills: 0 | Status: DISCONTINUED | OUTPATIENT
Start: 2019-08-30 | End: 2019-08-30

## 2019-08-30 RX ORDER — NITROFURANTOIN MACROCRYSTAL 50 MG
1 CAPSULE ORAL
Qty: 1 | Refills: 0
Start: 2019-08-30

## 2019-08-30 RX ORDER — POTASSIUM PHOSPHATE, MONOBASIC POTASSIUM PHOSPHATE, DIBASIC 236; 224 MG/ML; MG/ML
15 INJECTION, SOLUTION INTRAVENOUS ONCE
Refills: 0 | Status: COMPLETED | OUTPATIENT
Start: 2019-08-30 | End: 2019-08-30

## 2019-08-30 RX ORDER — PANTOPRAZOLE SODIUM 20 MG/1
1 TABLET, DELAYED RELEASE ORAL
Qty: 30 | Refills: 0
Start: 2019-08-30 | End: 2019-09-28

## 2019-08-30 RX ADMIN — POLYETHYLENE GLYCOL 3350 17 GRAM(S): 17 POWDER, FOR SOLUTION ORAL at 05:45

## 2019-08-30 RX ADMIN — ESCITALOPRAM OXALATE 10 MILLIGRAM(S): 10 TABLET, FILM COATED ORAL at 11:06

## 2019-08-30 RX ADMIN — ENOXAPARIN SODIUM 40 MILLIGRAM(S): 100 INJECTION SUBCUTANEOUS at 11:06

## 2019-08-30 RX ADMIN — Medication 100 MILLIGRAM(S): at 05:45

## 2019-08-30 RX ADMIN — SODIUM CHLORIDE 50 MILLILITER(S): 9 INJECTION, SOLUTION INTRAVENOUS at 11:06

## 2019-08-30 RX ADMIN — POTASSIUM PHOSPHATE, MONOBASIC POTASSIUM PHOSPHATE, DIBASIC 63.75 MILLIMOLE(S): 236; 224 INJECTION, SOLUTION INTRAVENOUS at 11:06

## 2019-08-30 RX ADMIN — Medication 81 MILLIGRAM(S): at 11:06

## 2019-08-30 RX ADMIN — DEXTROSE MONOHYDRATE, SODIUM CHLORIDE, AND POTASSIUM CHLORIDE 150 MILLILITER(S): 50; .745; 4.5 INJECTION, SOLUTION INTRAVENOUS at 05:44

## 2019-08-30 RX ADMIN — Medication 100 MILLIGRAM(S): at 05:44

## 2019-08-30 RX ADMIN — Medication 100 MILLIGRAM(S): at 17:19

## 2019-08-30 RX ADMIN — ENOXAPARIN SODIUM 40 MILLIGRAM(S): 100 INJECTION SUBCUTANEOUS at 05:45

## 2019-08-30 RX ADMIN — Medication 100 MILLIGRAM(S): at 11:08

## 2019-08-30 RX ADMIN — PANTOPRAZOLE SODIUM 40 MILLIGRAM(S): 20 TABLET, DELAYED RELEASE ORAL at 05:45

## 2019-08-30 NOTE — PROGRESS NOTE ADULT - ASSESSMENT
69 F PMH urinary retention unknown origin(self straight cath 6-7xdaily), TIA 2 yrs ago on Aspirin, PSH , presenting with 3 days of LLQ pain CT with evidence of sigmoid colitis and possible stricture of descending colon.    NPO/IVF  Macrobid for UTI  Pain/nausea control  OOBA/Lovenox/SCDs  AM labs  C-scope
69 F PMH urinary retention unknown origin(self straight cath 6-7xdaily), TIA 2 yrs ago on Aspirin, PSH , presenting with 3 days of LLQ pain CT with evidence of sigmoid colitis and possible stricture of descending colon.    NPO/IVF  Macrobid for UTI  Pain/nausea control  OOBA/Lovenox/SCDs  AM labs  GI consult
colonoscopy to terminal ileum shows divericulosis of sigmoid with some mucosal congestion, rectal mucosa showed red streaks and some ?pseudomembranes: recommend: Rx with antibiotics, send stool for C Diif.  Diet as tolerated

## 2019-08-30 NOTE — DISCHARGE NOTE PROVIDER - CARE PROVIDER_API CALL
Thor Capone)  Medicine  [address], [address]  [place] [address]  Phone: [phone]  Fax: [phone]  Follow Up Time:     Shira Vanegas)  Surgery  [address], [address]  [place] [address]  Phone: [phone]  Fax: [phone]  Follow Up Time:

## 2019-08-30 NOTE — PROGRESS NOTE ADULT - SUBJECTIVE AND OBJECTIVE BOX
Underwent colonoscopy with BX, no stricture or mass. Doing ok. Abdomen is soft, NT, ND. D/C home with f/u as out PT

## 2019-08-30 NOTE — DISCHARGE NOTE PROVIDER - NSDCFUADDINST_GEN_ALL_CORE_FT
General Discharge Instructions:  Please resume all regular home medications unless specifically advised not to take a particular medication. Also, please take any new medications as prescribed.  Please get plenty of rest, continue to ambulate several times per day, and drink adequate amounts of fluids. Avoid lifting weights greater than 5-10 lbs until you follow-up with your surgeon, who will instruct you further regarding activity restrictions.  Avoid driving or operating heavy machinery while taking pain medications.  Please follow-up with your surgeon and Primary Care Provider (PCP) as advised.    Warning Signs:  Please call your doctor or nurse practitioner if you experience the following:  *You experience new chest pain, pressure, squeezing or tightness.  *New or worsening cough, shortness of breath, or wheeze.  *If you are vomiting and cannot keep down fluids or your medications.  *You are getting dehydrated due to continued vomiting, diarrhea, or other reasons. Signs of dehydration include dry mouth, rapid heartbeat, or feeling dizzy or faint when standing.  *You see blood or dark/black material when you vomit or have a bowel movement.  *You experience burning when you urinate, have blood in your urine, or experience a discharge.  *Your pain is not improving within 8-12 hours or is not gone within 24 hours. Call or return immediately if your pain is getting worse, changes location, or moves to your chest or back.  *You have shaking chills, or fever greater than 101.5 degrees Fahrenheit or 38 degrees Celsius.  *Any change in your symptoms, or any new symptoms that concern you.

## 2019-08-30 NOTE — PROGRESS NOTE ADULT - SUBJECTIVE AND OBJECTIVE BOX
SUBJECTIVE: Drinking prep, having liquid stools, abd discomfort improving. Patient seen and examined bedside by chief resident.    aspirin  chewable 81 milliGRAM(s) Oral daily  enoxaparin Injectable 40 milliGRAM(s) SubCutaneous daily  nitrofurantoin monohydrate/macrocrystals (MACROBID) 100 milliGRAM(s) Oral two times a day with meals    MEDICATIONS  (PRN):  ondansetron Injectable 4 milliGRAM(s) IV Push every 4 hours PRN Nausea and/or Vomiting      I&O's Detail    29 Aug 2019 07:01  -  30 Aug 2019 07:00  --------------------------------------------------------  IN:    dextrose 5% + lactated ringers with potassium chloride 20 mEq/L: 2250 mL    lactated ringers.: 900 mL  Total IN: 3150 mL    OUT:    Voided: 1650 mL  Total OUT: 1650 mL    Total NET: 1500 mL          Vital Signs Last 24 Hrs  T(C): 36.9 (30 Aug 2019 05:46), Max: 37.4 (29 Aug 2019 21:00)  T(F): 98.4 (30 Aug 2019 05:46), Max: 99.4 (29 Aug 2019 21:00)  HR: 89 (30 Aug 2019 05:46) (60 - 89)  BP: 118/73 (30 Aug 2019 05:46) (114/75 - 145/80)  BP(mean): --  RR: 16 (30 Aug 2019 05:46) (16 - 18)  SpO2: 98% (30 Aug 2019 05:46) (95% - 98%)    General: NAD, resting comfortably in bed  C/V: NSR  Pulm: Nonlabored breathing, no respiratory distress  Abd: soft, NT/ND    LABS:                        11.0   8.62  )-----------( 291      ( 29 Aug 2019 05:45 )             36.7     08-    141  |  109<H>  |  11  ----------------------------<  93  3.5   |  22  |  0.82    Ca    8.2<L>      29 Aug 2019 05:45  Phos  2.2       Mg     2.0         TPro  7.1  /  Alb  3.7  /  TBili  0.9  /  DBili  <0.2  /  AST  25  /  ALT  18  /  AlkPhos  118      PT/INR - ( 28 Aug 2019 14:15 )   PT: 12.5 sec;   INR: 1.10          PTT - ( 28 Aug 2019 14:15 )  PTT:27.8 sec  Urinalysis Basic - ( 28 Aug 2019 14:02 )    Color: Yellow / Appearance: Clear / S.020 / pH: x  Gluc: x / Ketone: 15 mg/dL  / Bili: Small / Urobili: 0.2 E.U./dL   Blood: x / Protein: 30 mg/dL / Nitrite: NEGATIVE   Leuk Esterase: Trace / RBC: < 5 /HPF / WBC > 10 /HPF   Sq Epi: x / Non Sq Epi: 0-5 /HPF / Bacteria: Present /HPF        RADIOLOGY & ADDITIONAL STUDIES:  CT Abdomen and Pelvis w/ Oral Cont and w/ IV Cont:   EXAM:  CT ABDOMEN AND PELVIS OC IC                          PROCEDURE DATE:  2019          INTERPRETATION:  CT of the ABDOMEN and PELVIS with intravenous contrast   dated 2019 2:39 AM    INDICATION: Sigmoid colitis and also bowel obstruction.    TECHNIQUE: CT of the abdomen and pelvis was performed using oral contrast   and 100 cc of intravenous Optiray 350. Axial, sagittal and coronal images   were created and reviewed.    COMPARISON: CT 2019    FINDINGS:     BOWEL: Grossly stable appearance of markedly edematous sigmoid colon   demonstrating wall thickening and mucosal hyperenhancement. No   significant interval change in the appearance of focal area of possible   caliber change and luminal narrowing in the mid sigmoid colon. Associated   mild pericolic fluid/fat stranding along the sigmoid colon, as well as   distal descending colon. The descending colon is filled with moderate   amount of stool, and borderline dilated, which is unchanged.     Unchanged additional findings since 2019 CT, including: Mild diffuse   bladder wall thickening, Large hiatal hernia containing proximal half of   the stomach, hypodensities in the liver, multiple infectious/inflammatory   nodular ground glass opacities in both lower lungs.      FINAL   IMPRESSION:  1.  No significant interval change in extensive sigmoid colitis. No   significant interval change in the borderline distended descending colon   with moderate amount of stool. Findings may reflect partial obstruction.   2.  Stable appearance of collapsed segment in the mid sigmoid loop in the   right pelvic cavity, which may reflect focal stricture versus collapsed   segment with severe edema. Attention on follow-up is recommended, once   the inflammation subsides.  3.  Stable additional findings.                  Thank you for the opportunity to participate in the care of this patient.    SHWETA GONCALVES M.D., RADIOLOGY RESIDENT  This document has been electronically signed.  JAVAN NORRIS M.D., ATTENDING RADIOLOGIST  This document has been electronically signed. Aug 29 2019 10:03AM               (19 @ 02:39)  CT Abdomen and Pelvis w/ Oral Cont and w/ IV Cont:   EXAM:  CT ABDOMEN AND PELVIS OC IC                          PROCEDURE DATE:  2019          INTERPRETATION:  CLINICAL INFORMATION: Abdominal pain    COMPARISON: None.    PROCEDURE:   CT of the Abdomen and Pelvis was performed with intravenous contrast.   Intravenous contrast: 90 ml Omnipaque 350. 10 ml discarded.  Oral contrast: positive contrast was administered.  Sagittal and coronal reformats were performed.    FINDINGS:    LOWER CHEST: Basilar atelectasis in the right lower lobe. Multiple 3 to 5   mm groundglass opacities in the visualized bilateral lower lungs and   bronchovascular distribution. Linear atelectases in both lower lobes. No   pleural effusion.    LIVER: Punctate hypodensity in the segment 2 (3:13), likely hepatic cyst.   6 mm focal hypodensity in the segment 8 (3:20), too small to   characterize, and may reflect hepatic hemangioma. The main portal vein is   patent.  GALLBLADDER AND BILIARY DUCTS: Normal caliber.  SPLEEN: Within normal limits.  PANCREAS: Within normal limits.  ADRENALS: Within normal limits.  KIDNEYS/URETERS: Mildly dilated palpable calyceal system bilaterally.   Nonobstructing 4 mm renal stone in the left interpolar kidney. No focal   parenchymal abnormality.    BLADDER: Mild diffuse wall thickening of urinary bladder.  REPRODUCTIVE ORGANS: The uterus and adnexa are unremarkable.    BOWEL: Markedly edematous sigmoid colon demonstrating wall thickening and   mucosal hyperenhancement. Focal area of possible caliber change and   luminal narrowing in the mid sigmoid colon (3:83) is noted. Associated   mild pericolic fluid/fat stranding along the sigmoid colon.   Large hiatal hernia containing proximal half of the stomach. The   dependent transverse colon, with cecum and ileocecal valve identified in   the right upper quadrant. Moderate fecal material within the colon. No   evidence of obstruction.  PERITONEUM/RETROPERITONEUM: Minimal pelvic ascites.  VESSELS:  Atherosclerotic calcification in the abdominal aorta.  LYMPH NODES: Lymph nodes are not enlarged.    BONES AND SOFT TISSUE: Degenerative change in the spine.      IMPRESSION:   1.  Severe diffuse bowel wall thickening in the sigmoid colon, likely   reflecting colitis. Possible are of focal stenosis in the mid sigmoid   coloncausing possible partial obstruction. Moderate fecal impaction in   the colon in the descending colon with mild dilatation. Follow-up with   colonoscopy is recommended once the active inflammation is resolved, to   evaluate for occult malignancy.  2. Mild diffuse thickening of urinary bladder wall with mild fat   stranding, may reflect cystitis.  3.  Large hiatal hernia containing proximal half of the stomach.  4.  4 mm nonobstructing left renal stone.   5.  Multiple 3-5 mm nodular ground glass opacities in both lower lungs,   may be infectious/inflammatory. Follow-up with chest CT at 3 months is   recommended to ensure resolution.                 Thank you for the opportunity to participate in the care of this patient.        JAVAN NORRIS M.D., ATTENDING RADIOLOGIST  This document has been electronically signed. Aug 28 2019  4:50PM               (19 @ 16:08)

## 2019-08-30 NOTE — PROGRESS NOTE ADULT - SUBJECTIVE AND OBJECTIVE BOX
Pt seen and examined   for colonoscopy today  labs shows lower Hgb    REVIEW OF SYSTEMS:  Constitutional: No fever, weight loss or fatigue  Cardiovascular: No chest pain, palpitations, dizziness or leg swelling  Gastrointestinal: No abdominal or epigastric pain. No nausea, vomiting or hematemesis; No melena or hematochezia.  Skin: No itching, burning, rashes or lesions       MEDICATIONS:  MEDICATIONS  (STANDING):  aspirin  chewable 81 milliGRAM(s) Oral daily  atorvastatin 20 milliGRAM(s) Oral at bedtime  dextrose 5% + lactated ringers with potassium chloride 20 mEq/L 1000 milliLiter(s) (150 mL/Hr) IV Continuous <Continuous>  docusate sodium 100 milliGRAM(s) Oral three times a day  enoxaparin Injectable 40 milliGRAM(s) SubCutaneous daily  escitalopram 10 milliGRAM(s) Oral daily  nitrofurantoin monohydrate/macrocrystals (MACROBID) 100 milliGRAM(s) Oral two times a day with meals  pantoprazole    Tablet 40 milliGRAM(s) Oral before breakfast  polyethylene glycol 3350 17 Gram(s) Oral two times a day  potassium phosphate IVPB 15 milliMole(s) IV Intermittent once  senna 2 Tablet(s) Oral at bedtime    MEDICATIONS  (PRN):  ondansetron Injectable 4 milliGRAM(s) IV Push every 4 hours PRN Nausea and/or Vomiting      Allergies    sulfa drugs (Unknown)    Intolerances        Vital Signs Last 24 Hrs  T(C): 36.9 (30 Aug 2019 05:46), Max: 37.4 (29 Aug 2019 21:00)  T(F): 98.4 (30 Aug 2019 05:46), Max: 99.4 (29 Aug 2019 21:00)  HR: 89 (30 Aug 2019 05:46) (60 - 89)  BP: 118/73 (30 Aug 2019 05:46) (114/75 - 145/80)  BP(mean): --  RR: 16 (30 Aug 2019 05:46) (16 - 18)  SpO2: 98% (30 Aug 2019 05:46) (95% - 98%)     @ 07:01  -   @ 07:00  --------------------------------------------------------  IN: 3150 mL / OUT: 1650 mL / NET: 1500 mL        PHYSICAL EXAM:    General: ; in no acute distress  HEENT: MMM, conjunctiva and sclera clear  Gastrointestinal: Soft non-tender non-distended; Normal bowel sounds;   Skin: Warm and dry. No obvious rash    LABS:      CBC Full  -  ( 30 Aug 2019 07:38 )  WBC Count : 7.86 K/uL  RBC Count : 3.44 M/uL  Hemoglobin : 9.8 g/dL  Hematocrit : 31.2 %  Platelet Count - Automated : 259 K/uL  Mean Cell Volume : 90.7 fl  Mean Cell Hemoglobin : 28.5 pg  Mean Cell Hemoglobin Concentration : 31.4 gm/dL  Auto Neutrophil # : x  Auto Lymphocyte # : x  Auto Monocyte # : x  Auto Eosinophil # : x  Auto Basophil # : x  Auto Neutrophil % : x  Auto Lymphocyte % : x  Auto Monocyte % : x  Auto Eosinophil % : x  Auto Basophil % : x    08-30    146<H>  |  112<H>  |  8   ----------------------------<  88  4.2   |  25  |  0.72    Ca    8.6      30 Aug 2019 07:38  Phos  2.1     08-30  Mg     2.0     08-30    TPro  7.1  /  Alb  3.7  /  TBili  0.9  /  DBili  <0.2  /  AST  25  /  ALT  18  /  AlkPhos  118  08-28    PT/INR - ( 28 Aug 2019 14:15 )   PT: 12.5 sec;   INR: 1.10          PTT - ( 28 Aug 2019 14:15 )  PTT:27.8 sec      Urinalysis Basic - ( 28 Aug 2019 14:02 )    Color: Yellow / Appearance: Clear / S.020 / pH: x  Gluc: x / Ketone: 15 mg/dL  / Bili: Small / Urobili: 0.2 E.U./dL   Blood: x / Protein: 30 mg/dL / Nitrite: NEGATIVE   Leuk Esterase: Trace / RBC: < 5 /HPF / WBC > 10 /HPF   Sq Epi: x / Non Sq Epi: 0-5 /HPF / Bacteria: Present /HPF                RADIOLOGY & ADDITIONAL STUDIES (The following images were personally reviewed):

## 2019-08-30 NOTE — DISCHARGE NOTE NURSING/CASE MANAGEMENT/SOCIAL WORK - PATIENT PORTAL LINK FT
You can access the FollowMyHealth Patient Portal offered by [facility] by registering at the following website: [email or web address]. By joining [employer]’s FollowMyHealth portal, you will also be able to view your health information using other applications (apps) compatible with our system.

## 2019-08-30 NOTE — DISCHARGE NOTE PROVIDER - NSDCFUADDAPPT_GEN_ALL_CORE_FT
Please call Dr. Vanegas's office this week to schedule a follow-up appointment.    Please also call Dr. Capone's office this week to schedule a follow-up appointment.

## 2019-08-30 NOTE — DISCHARGE NOTE PROVIDER - HOSPITAL COURSE
69 F PMH urinary retention unknown origin(self straight cath 6-7xdaily), TIA 2 yrs ago on Aspirin, PSH , presenting with 3 days of LLQ pain associated with one episode of nausea and spit up, Pt states sx started  at rest and worsened to moderated achy pain, pt went to urgent care and found to have UTI, placed on Macrobid. Today pt states pain continued and she presented to ED for further management. Denies N/vomiting, last episode of nausea and spit up on , denies F/chills, denies chest pain/dypnea. Passing flatus, reports constipation this past week but had one well formed bowel movement yesterday and another well formed normal BM this am. Denies ever having EGD, last colonoscopy 7-8 yrs ago and normal per pt. Denies personal or family hx of IBD, denies family hx of cancer.        Patient was admitted to General Surgery for further management for suspected sigmoid colitis and possible stricture of descending colon from. Pt was made NPO and received IV fluids and adequate pain medication.         A CT abd/pelvis revealed: Severe diffuse bowel wall thickening in the sigmoid colon, likely reflecting colitis. Possible are of focal stenosis in the mid sigmoid colon causing possible partial obstruction. Moderate fecal impaction in the colon in the descending colon with mild dilatation. Follow-up with colonoscopy is recommended once the active inflammation is resolved, to evaluate for occult malignancy. A repeat CT was obtained the following day for further evaluation. This revealed: "No significant interval change in extensive sigmoid colitis. No significant interval change in the borderline distended descending colon with moderate amount of stool. Findings may reflect partial obstruction. Stable appearance of collapsed segment in the mid sigmoid loop in the right pelvic cavity, which may reflect focal stricture versus collapsed segment with severe edema."         Gastroenterology was consulted and a colonoscopy was obtained. This revealed diverticulosis of sigmoid with some mucosal congestion, rectal mucosa showed red streaks. Pt is now tolerating a soft diet without nausea/vomiting, ambulating without difficulty, and voiding spontaneously with bowel function. Pain is well controlled on oral pain medication. Pt has been deemed ready for discharge and will follow up with the surgeon.        Of note: CT was notable for a large asymptomatic hiatal hernia which should be addressed as an outpatient.

## 2019-08-30 NOTE — PROGRESS NOTE ADULT - REASON FOR ADMISSION
Large Bowel stricture

## 2019-08-30 NOTE — DISCHARGE NOTE PROVIDER - NSDCCPCAREPLAN_GEN_ALL_CORE_FT
PRINCIPAL DISCHARGE DIAGNOSIS  Diagnosis: Colitis  Assessment and Plan of Treatment:       SECONDARY DISCHARGE DIAGNOSES  Diagnosis: Leukocytosis, unspecified type  Assessment and Plan of Treatment:

## 2019-09-03 LAB — SURGICAL PATHOLOGY STUDY: SIGNIFICANT CHANGE UP

## 2019-09-04 DIAGNOSIS — K52.9 NONINFECTIVE GASTROENTERITIS AND COLITIS, UNSPECIFIED: ICD-10-CM

## 2019-09-04 DIAGNOSIS — K57.30 DIVERTICULOSIS OF LARGE INTESTINE WITHOUT PERFORATION OR ABSCESS WITHOUT BLEEDING: ICD-10-CM

## 2019-09-04 DIAGNOSIS — R33.9 RETENTION OF URINE, UNSPECIFIED: ICD-10-CM

## 2019-09-04 DIAGNOSIS — Z86.73 PERSONAL HISTORY OF TRANSIENT ISCHEMIC ATTACK (TIA), AND CEREBRAL INFARCTION WITHOUT RESIDUAL DEFICITS: ICD-10-CM

## 2019-09-04 DIAGNOSIS — K44.9 DIAPHRAGMATIC HERNIA WITHOUT OBSTRUCTION OR GANGRENE: ICD-10-CM

## 2019-09-04 DIAGNOSIS — Z79.82 LONG TERM (CURRENT) USE OF ASPIRIN: ICD-10-CM

## 2019-09-04 DIAGNOSIS — N39.0 URINARY TRACT INFECTION, SITE NOT SPECIFIED: ICD-10-CM

## 2019-10-01 PROCEDURE — 87086 URINE CULTURE/COLONY COUNT: CPT

## 2019-10-01 PROCEDURE — 74177 CT ABD & PELVIS W/CONTRAST: CPT

## 2019-10-01 PROCEDURE — 85730 THROMBOPLASTIN TIME PARTIAL: CPT

## 2019-10-01 PROCEDURE — 83690 ASSAY OF LIPASE: CPT

## 2019-10-01 PROCEDURE — 96365 THER/PROPH/DIAG IV INF INIT: CPT | Mod: XU

## 2019-10-01 PROCEDURE — 36415 COLL VENOUS BLD VENIPUNCTURE: CPT

## 2019-10-01 PROCEDURE — 96361 HYDRATE IV INFUSION ADD-ON: CPT

## 2019-10-01 PROCEDURE — 81001 URINALYSIS AUTO W/SCOPE: CPT

## 2019-10-01 PROCEDURE — 80048 BASIC METABOLIC PNL TOTAL CA: CPT

## 2019-10-01 PROCEDURE — 88305 TISSUE EXAM BY PATHOLOGIST: CPT

## 2019-10-01 PROCEDURE — 85027 COMPLETE CBC AUTOMATED: CPT

## 2019-10-01 PROCEDURE — 96375 TX/PRO/DX INJ NEW DRUG ADDON: CPT

## 2019-10-01 PROCEDURE — 80076 HEPATIC FUNCTION PANEL: CPT

## 2019-10-01 PROCEDURE — 84100 ASSAY OF PHOSPHORUS: CPT

## 2019-10-01 PROCEDURE — 83735 ASSAY OF MAGNESIUM: CPT

## 2019-10-01 PROCEDURE — 99285 EMERGENCY DEPT VISIT HI MDM: CPT | Mod: 25

## 2019-10-01 PROCEDURE — 86803 HEPATITIS C AB TEST: CPT

## 2019-10-01 PROCEDURE — 85610 PROTHROMBIN TIME: CPT

## 2022-12-19 NOTE — ED PROVIDER NOTE - CONSTITUTIONAL, MLM
normal... Well appearing, well nourished, awake, alert, oriented to person, place, time/situation and in no apparent distress. CONSTITUTIONAL: lying comfortably on hospital bed, complaining of belly pain  HEAD: NCAT  ENMT: Airway patent, TM normal bilaterally, normal appearing mouth, nose, throat, neck supple with full range of motion, no cervical adenopathy, tachy mucous membranes.  EYES: Pupils equal, round and reactive to light, Extra-ocular movement intact, eyes are clear b/l  CARDIAC: Mildly tachycardic 138bpm, normal rhythm, Heart sounds S1 S2 present, no murmurs, rubs or gallops, cap refill <2 seconds  RESPIRATORY: No respiratory distress. No stridor, Lungs sounds clear with good aeration bilaterally.  GASTROINTESTINAL: Abdomen soft, epigastric tenderness on deep palpation, non distended, no rebound, no guarding, no hepatosplenomegaly.   MUSCULOSKELETAL: Movement of extremities grossly intact.  NEUROLOGICAL: Alert and interactive, no focal deficits  SKIN: No cyanosis, no pallor, no jaundice, no rash  HEME LYMPH: No pallor, no cervical/supraclavicular/inguinal adenopathy.  No splenomegaly